# Patient Record
Sex: MALE | Race: WHITE | Employment: FULL TIME | URBAN - METROPOLITAN AREA
[De-identification: names, ages, dates, MRNs, and addresses within clinical notes are randomized per-mention and may not be internally consistent; named-entity substitution may affect disease eponyms.]

---

## 2017-01-20 PROCEDURE — 82043 UR ALBUMIN QUANTITATIVE: CPT | Performed by: INTERNAL MEDICINE

## 2017-01-20 PROCEDURE — 84153 ASSAY OF PSA TOTAL: CPT | Performed by: INTERNAL MEDICINE

## 2017-01-20 PROCEDURE — 82570 ASSAY OF URINE CREATININE: CPT | Performed by: INTERNAL MEDICINE

## 2018-02-19 PROBLEM — G47.33 OSA (OBSTRUCTIVE SLEEP APNEA): Status: ACTIVE | Noted: 2018-02-19

## 2018-02-19 PROCEDURE — 84153 ASSAY OF PSA TOTAL: CPT | Performed by: INTERNAL MEDICINE

## 2018-02-19 PROCEDURE — 82570 ASSAY OF URINE CREATININE: CPT | Performed by: INTERNAL MEDICINE

## 2018-02-19 PROCEDURE — 82043 UR ALBUMIN QUANTITATIVE: CPT | Performed by: INTERNAL MEDICINE

## 2018-06-02 ENCOUNTER — APPOINTMENT (OUTPATIENT)
Dept: CT IMAGING | Facility: HOSPITAL | Age: 43
End: 2018-06-02
Attending: EMERGENCY MEDICINE
Payer: COMMERCIAL

## 2018-06-02 ENCOUNTER — HOSPITAL ENCOUNTER (EMERGENCY)
Facility: HOSPITAL | Age: 43
Discharge: HOME | End: 2018-06-03
Attending: EMERGENCY MEDICINE
Payer: COMMERCIAL

## 2018-06-02 DIAGNOSIS — R10.12 ABDOMINAL PAIN, LEFT UPPER QUADRANT: Primary | ICD-10-CM

## 2018-06-02 PROCEDURE — 96361 HYDRATE IV INFUSION ADD-ON: CPT

## 2018-06-02 PROCEDURE — 74176 CT ABD & PELVIS W/O CONTRAST: CPT | Performed by: EMERGENCY MEDICINE

## 2018-06-02 PROCEDURE — 80076 HEPATIC FUNCTION PANEL: CPT | Performed by: EMERGENCY MEDICINE

## 2018-06-02 PROCEDURE — 85007 BL SMEAR W/DIFF WBC COUNT: CPT | Performed by: EMERGENCY MEDICINE

## 2018-06-02 PROCEDURE — 96374 THER/PROPH/DIAG INJ IV PUSH: CPT

## 2018-06-02 PROCEDURE — 85025 COMPLETE CBC W/AUTO DIFF WBC: CPT | Performed by: EMERGENCY MEDICINE

## 2018-06-02 PROCEDURE — 83690 ASSAY OF LIPASE: CPT | Performed by: EMERGENCY MEDICINE

## 2018-06-02 PROCEDURE — 81003 URINALYSIS AUTO W/O SCOPE: CPT | Performed by: EMERGENCY MEDICINE

## 2018-06-02 PROCEDURE — 85027 COMPLETE CBC AUTOMATED: CPT | Performed by: EMERGENCY MEDICINE

## 2018-06-02 PROCEDURE — 99284 EMERGENCY DEPT VISIT MOD MDM: CPT

## 2018-06-02 PROCEDURE — 80048 BASIC METABOLIC PNL TOTAL CA: CPT | Performed by: EMERGENCY MEDICINE

## 2018-06-02 RX ORDER — KETOROLAC TROMETHAMINE 15 MG/ML
15 INJECTION, SOLUTION INTRAMUSCULAR; INTRAVENOUS ONCE
Status: COMPLETED | OUTPATIENT
Start: 2018-06-02 | End: 2018-06-02

## 2018-06-03 VITALS
RESPIRATION RATE: 18 BRPM | BODY MASS INDEX: 39.37 KG/M2 | OXYGEN SATURATION: 97 % | HEIGHT: 66 IN | SYSTOLIC BLOOD PRESSURE: 133 MMHG | HEART RATE: 74 BPM | DIASTOLIC BLOOD PRESSURE: 70 MMHG | TEMPERATURE: 98 F | WEIGHT: 245 LBS

## 2018-06-03 RX ORDER — PANTOPRAZOLE SODIUM 40 MG/1
40 TABLET, DELAYED RELEASE ORAL DAILY
Qty: 14 TABLET | Refills: 0 | Status: SHIPPED | OUTPATIENT
Start: 2018-06-03 | End: 2018-06-17

## 2018-06-03 NOTE — ED PROVIDER NOTES
Patient Seen in: [facility] AND [facility] Emergency Department    History   Patient presents with:  Abdomen/Flank Pain (GI/)    Stated Complaint: Abdo/Back Pain    HPI    51-year-old male has had intermittent left flank pain for about 1 week.   He now complain 98 °F (36.7 °C)   Resp 16   Ht 167.6 cm (5' 6\")   Wt 111.1 kg   SpO2 97%   BMI 39.54 kg/m²         Physical Exam   Constitutional: He is oriented to person, place, and time. He appears well-developed and well-nourished. No distress.    HENT:   Head: Normoc following orders were created for panel order CBC WITH DIFFERENTIAL WITH PLATELET.   Procedure                               Abnormality         Status                     ---------                               -----------         ------

## 2021-04-11 DIAGNOSIS — Z23 NEED FOR VACCINATION: ICD-10-CM

## 2021-05-27 ENCOUNTER — APPOINTMENT (OUTPATIENT)
Dept: GENERAL RADIOLOGY | Facility: HOSPITAL | Age: 46
End: 2021-05-27
Attending: EMERGENCY MEDICINE
Payer: COMMERCIAL

## 2021-05-27 ENCOUNTER — HOSPITAL ENCOUNTER (EMERGENCY)
Facility: HOSPITAL | Age: 46
Discharge: HOME | End: 2021-05-28
Attending: EMERGENCY MEDICINE
Payer: COMMERCIAL

## 2021-05-27 DIAGNOSIS — J40 BRONCHITIS: Primary | ICD-10-CM

## 2021-05-27 PROCEDURE — 99284 EMERGENCY DEPT VISIT MOD MDM: CPT

## 2021-05-27 PROCEDURE — 93010 ELECTROCARDIOGRAM REPORT: CPT | Performed by: EMERGENCY MEDICINE

## 2021-05-27 PROCEDURE — 71045 X-RAY EXAM CHEST 1 VIEW: CPT | Performed by: EMERGENCY MEDICINE

## 2021-05-27 PROCEDURE — 94640 AIRWAY INHALATION TREATMENT: CPT

## 2021-05-27 PROCEDURE — 93005 ELECTROCARDIOGRAM TRACING: CPT

## 2021-05-27 RX ORDER — IPRATROPIUM BROMIDE AND ALBUTEROL SULFATE 2.5; .5 MG/3ML; MG/3ML
3 SOLUTION RESPIRATORY (INHALATION) ONCE
Status: COMPLETED | OUTPATIENT
Start: 2021-05-27 | End: 2021-05-27

## 2021-05-28 VITALS
TEMPERATURE: 98 F | DIASTOLIC BLOOD PRESSURE: 81 MMHG | SYSTOLIC BLOOD PRESSURE: 103 MMHG | RESPIRATION RATE: 18 BRPM | OXYGEN SATURATION: 95 % | HEART RATE: 103 BPM | WEIGHT: 259 LBS | BODY MASS INDEX: 41 KG/M2

## 2021-05-28 RX ORDER — PREDNISONE 20 MG/1
40 TABLET ORAL ONCE
Status: COMPLETED | OUTPATIENT
Start: 2021-05-28 | End: 2021-05-28

## 2021-05-28 RX ORDER — PREDNISONE 20 MG/1
40 TABLET ORAL DAILY
Qty: 10 TABLET | Refills: 0 | Status: ON HOLD | OUTPATIENT
Start: 2021-05-28 | End: 2021-06-04

## 2021-05-28 RX ORDER — ALBUTEROL SULFATE 90 UG/1
2 AEROSOL, METERED RESPIRATORY (INHALATION) EVERY 4 HOURS PRN
Qty: 18 G | Refills: 0 | Status: SHIPPED | OUTPATIENT
Start: 2021-05-28 | End: 2021-06-27

## 2021-05-28 NOTE — ED PROVIDER NOTES
Patient Seen in: [facility] AND [facility] Emergency Department    History   Patient presents with:  Cough/URI  Difficulty Breathing      HPI    The patient presents to the ED complaining of a dry cough and shortness of breath started today.   He states that he i No      Sexual activity: Never    Other Topics      Concerns:        Caffeine Concern: No      ROS  Pertinent positives: Cough, shortness of breath, wheezing  All other organ systems are reviewed and are negative.     Constitutional and vital signs reviewed Psychiatric:         Mood and Affect: Mood normal.         Behavior: Behavior normal.         ED Course      Labs Reviewed - No data to display  EKG    Rate, intervals and axes as noted on EKG Report.   Rate: Tachycardia  Rhythm: Sinus Rhythm  Reading: No department: Stable    Disposition and Plan     Clinical Impression:  Bronchitis  (primary encounter diagnosis)    Disposition:  Discharge    Follow-up:  Osvaldo Barnard MD  [address]    Schedule an a

## 2021-05-28 NOTE — ED QUICK NOTES
Discharge instructions given to patient. Patient states understanding. Patient alert and oriented leaving with spouse. Instructed patient to follow up with MD listed on discharge papers, or return to ED if symptoms worsen.

## 2021-05-29 ENCOUNTER — APPOINTMENT (OUTPATIENT)
Dept: GENERAL RADIOLOGY | Facility: HOSPITAL | Age: 46
DRG: 286 | End: 2021-05-29
Attending: EMERGENCY MEDICINE
Payer: COMMERCIAL

## 2021-05-29 ENCOUNTER — HOSPITAL ENCOUNTER (INPATIENT)
Facility: HOSPITAL | Age: 46
LOS: 8 days | Discharge: HOME | DRG: 286 | End: 2021-06-07
Attending: EMERGENCY MEDICINE | Admitting: HOSPITALIST
Payer: COMMERCIAL

## 2021-05-29 DIAGNOSIS — I50.9 HEART FAILURE, UNSPECIFIED HF CHRONICITY, UNSPECIFIED HEART FAILURE TYPE (HCC): Primary | ICD-10-CM

## 2021-05-29 DIAGNOSIS — N17.9 AKI (ACUTE KIDNEY INJURY) (HCC): ICD-10-CM

## 2021-05-29 DIAGNOSIS — R06.00 DYSPNEA, UNSPECIFIED TYPE: ICD-10-CM

## 2021-05-29 PROCEDURE — 71045 X-RAY EXAM CHEST 1 VIEW: CPT | Performed by: EMERGENCY MEDICINE

## 2021-05-29 PROCEDURE — 85025 COMPLETE CBC W/AUTO DIFF WBC: CPT | Performed by: EMERGENCY MEDICINE

## 2021-05-29 PROCEDURE — 83880 ASSAY OF NATRIURETIC PEPTIDE: CPT | Performed by: EMERGENCY MEDICINE

## 2021-05-29 PROCEDURE — 85060 BLOOD SMEAR INTERPRETATION: CPT | Performed by: EMERGENCY MEDICINE

## 2021-05-29 PROCEDURE — 96374 THER/PROPH/DIAG INJ IV PUSH: CPT

## 2021-05-29 PROCEDURE — 93010 ELECTROCARDIOGRAM REPORT: CPT | Performed by: EMERGENCY MEDICINE

## 2021-05-29 PROCEDURE — 84484 ASSAY OF TROPONIN QUANT: CPT | Performed by: EMERGENCY MEDICINE

## 2021-05-29 PROCEDURE — 80048 BASIC METABOLIC PNL TOTAL CA: CPT | Performed by: EMERGENCY MEDICINE

## 2021-05-29 PROCEDURE — 93005 ELECTROCARDIOGRAM TRACING: CPT

## 2021-05-29 PROCEDURE — 99285 EMERGENCY DEPT VISIT HI MDM: CPT

## 2021-05-30 ENCOUNTER — APPOINTMENT (OUTPATIENT)
Dept: CT IMAGING | Facility: HOSPITAL | Age: 46
DRG: 286 | End: 2021-05-30
Attending: HOSPITALIST
Payer: COMMERCIAL

## 2021-05-30 ENCOUNTER — APPOINTMENT (OUTPATIENT)
Dept: CT IMAGING | Facility: HOSPITAL | Age: 46
DRG: 286 | End: 2021-05-30
Attending: EMERGENCY MEDICINE
Payer: COMMERCIAL

## 2021-05-30 PROBLEM — R06.00 DYSPNEA: Status: ACTIVE | Noted: 2021-05-30

## 2021-05-30 PROBLEM — I50.9 HEART FAILURE, UNSPECIFIED HF CHRONICITY, UNSPECIFIED HEART FAILURE TYPE (HCC): Status: ACTIVE | Noted: 2021-05-30

## 2021-05-30 PROBLEM — R06.00 DYSPNEA, UNSPECIFIED TYPE: Status: ACTIVE | Noted: 2021-05-30

## 2021-05-30 PROCEDURE — 93010 ELECTROCARDIOGRAM REPORT: CPT | Performed by: HOSPITALIST

## 2021-05-30 PROCEDURE — 80048 BASIC METABOLIC PNL TOTAL CA: CPT | Performed by: HOSPITALIST

## 2021-05-30 PROCEDURE — 84132 ASSAY OF SERUM POTASSIUM: CPT | Performed by: HOSPITALIST

## 2021-05-30 PROCEDURE — 71260 CT THORAX DX C+: CPT | Performed by: EMERGENCY MEDICINE

## 2021-05-30 PROCEDURE — 84484 ASSAY OF TROPONIN QUANT: CPT | Performed by: HOSPITALIST

## 2021-05-30 PROCEDURE — 93005 ELECTROCARDIOGRAM TRACING: CPT

## 2021-05-30 PROCEDURE — 80061 LIPID PANEL: CPT | Performed by: HOSPITALIST

## 2021-05-30 PROCEDURE — 84443 ASSAY THYROID STIM HORMONE: CPT | Performed by: HOSPITALIST

## 2021-05-30 PROCEDURE — 74176 CT ABD & PELVIS W/O CONTRAST: CPT | Performed by: HOSPITALIST

## 2021-05-30 PROCEDURE — 83690 ASSAY OF LIPASE: CPT | Performed by: HOSPITALIST

## 2021-05-30 PROCEDURE — 84145 PROCALCITONIN (PCT): CPT | Performed by: HOSPITALIST

## 2021-05-30 RX ORDER — ACETAMINOPHEN 325 MG/1
650 TABLET ORAL EVERY 6 HOURS PRN
Status: DISCONTINUED | OUTPATIENT
Start: 2021-05-30 | End: 2021-06-07

## 2021-05-30 RX ORDER — ATORVASTATIN CALCIUM 40 MG/1
40 TABLET, FILM COATED ORAL DAILY
Status: DISCONTINUED | OUTPATIENT
Start: 2021-05-30 | End: 2021-06-07

## 2021-05-30 RX ORDER — FLUTICASONE PROPIONATE 50 MCG
2 SPRAY, SUSPENSION (ML) NASAL DAILY
Status: DISCONTINUED | OUTPATIENT
Start: 2021-05-30 | End: 2021-06-07

## 2021-05-30 RX ORDER — POTASSIUM CHLORIDE 20 MEQ/1
40 TABLET, EXTENDED RELEASE ORAL EVERY 4 HOURS
Status: COMPLETED | OUTPATIENT
Start: 2021-05-30 | End: 2021-05-30

## 2021-05-30 RX ORDER — FUROSEMIDE 10 MG/ML
40 INJECTION INTRAMUSCULAR; INTRAVENOUS ONCE
Status: COMPLETED | OUTPATIENT
Start: 2021-05-30 | End: 2021-05-30

## 2021-05-30 RX ORDER — ALBUTEROL SULFATE 90 UG/1
2 AEROSOL, METERED RESPIRATORY (INHALATION) EVERY 4 HOURS PRN
Status: DISCONTINUED | OUTPATIENT
Start: 2021-05-30 | End: 2021-06-07

## 2021-05-30 RX ORDER — FUROSEMIDE 10 MG/ML
20 INJECTION INTRAMUSCULAR; INTRAVENOUS
Status: DISCONTINUED | OUTPATIENT
Start: 2021-05-30 | End: 2021-06-01

## 2021-05-30 RX ORDER — ONDANSETRON 2 MG/ML
4 INJECTION INTRAMUSCULAR; INTRAVENOUS EVERY 6 HOURS PRN
Status: DISCONTINUED | OUTPATIENT
Start: 2021-05-30 | End: 2021-06-07

## 2021-05-30 RX ORDER — PROCHLORPERAZINE EDISYLATE 5 MG/ML
5 INJECTION INTRAMUSCULAR; INTRAVENOUS EVERY 8 HOURS PRN
Status: DISCONTINUED | OUTPATIENT
Start: 2021-05-30 | End: 2021-06-07

## 2021-05-30 RX ORDER — CETIRIZINE HYDROCHLORIDE 10 MG/1
10 TABLET ORAL DAILY
Status: DISCONTINUED | OUTPATIENT
Start: 2021-05-30 | End: 2021-06-07

## 2021-05-30 RX ORDER — MAGNESIUM HYDROXIDE/ALUMINUM HYDROXICE/SIMETHICONE 120; 1200; 1200 MG/30ML; MG/30ML; MG/30ML
30 SUSPENSION ORAL 4 TIMES DAILY PRN
Status: DISCONTINUED | OUTPATIENT
Start: 2021-05-30 | End: 2021-06-07

## 2021-05-30 RX ORDER — PANTOPRAZOLE SODIUM 40 MG/1
40 TABLET, DELAYED RELEASE ORAL
Status: DISCONTINUED | OUTPATIENT
Start: 2021-05-30 | End: 2021-06-05

## 2021-05-30 NOTE — ED QUICK NOTES
Assumed care of James upon arrival in room 48 via triage. Patient A&Ox4, on continuous cardiac monitoring at this time; tachy 110s. See triage note and nursing assessment.  Patient further reports dyspnea on exertion, orthopnea, and productive cough that exa

## 2021-05-30 NOTE — ED QUICK NOTES
Orders for admission, patient is aware of plan and ready to go upstairs. Any questions, please call ED RN Teresa Baxter  at extension [phone].    Type of COVID test sent:Rapid - not detected  COVID Suspicion level: Low/High    Titratable drug(s) infusing:NA  Rate:

## 2021-05-30 NOTE — ED INITIAL ASSESSMENT (HPI)
Patient complains of shortness of breath on and off since Thursday. Seen here and dx'd with bronchitis. Sent home with nebs, now complains of worsening shortness of breath associated with chest tightness of onset 9pm tonight. SOB unrelieved by home nebs.

## 2021-05-30 NOTE — ED QUICK NOTES
Verbal order given from MD Martino to wave labs prior to CT scan. Patient enroute for scanning at this time.

## 2021-05-30 NOTE — ED PROVIDER NOTES
Patient Seen in: [facility] AND [facility] Emergency Department      History   Patient presents with:  Chest Pain Angina  Difficulty Breathing    Stated Complaint: SOB     HPI/Subjective:   HPI    55-year-old male with history of hypertension, high cholesterol, HPI.  Constitutional and vital signs reviewed. All other systems reviewed and negative except as noted above.     Physical Exam     ED Triage Vitals [05/29/21 2219]   /80   Pulse (!) 125   Resp (!) 30   Temp 98 °F (36.7 °C)   Temp src    SpO2 96 (*)     RDW 16.6 (*)     Neutrophil Absolute Prelim 13.63 (*)     All other components within normal limits   TROPONIN I - Normal   RAPID SARS-COV-2 BY PCR - Normal   CBC WITH DIFFERENTIAL WITH PLATELET    Narrative:      The following orders were created f minimal bilateral pleural effusions. 4. No focal airspace disease suspicious for pneumonia. XR CHEST at 2357 hrs    COMPARISON: 5/27/2091    IMPRESSION:    Mild pulmonary edema. No confluent consolidation. No pleural effusion or pneumothorax.   Harrison Noted to desaturate to 92% on room air while sitting in bed, placed on 2 L nasal cannula and given Lasix. Discussed with hospitalist.  Admitted for further evaluation and monitoring. Further Inpatient evaluation and treatment will be required.  I per

## 2021-05-30 NOTE — H&P
[facility] Hospitalist H&P     CC: Patient presents with:  Chest Pain Angina  Difficulty Breathing     PCP: Harinder Gonsalez MD    Date of Admission: 5/29/2021 11:34 PM    ASSESSMENT / PLAN:     Mr. Dannie Juarez is a 27-year-old male with past medical history of h shortness of breath with laying flat and came back to the ER last night. CT chest was negative for PE, but was concerning for fluid overload. Patient also complaining of L sided abdominal pain, +nausea and vomiting.  Thought it was similar to his gastritis Rfl:           Soc Hx  Social History    Tobacco Use      Smoking status: Never Smoker      Smokeless tobacco: Never Used    Alcohol use: No       Fam Hx  Family History   Problem Relation Age of Onset   • Diabetes Mother    • Lipids Mother    • Hypertensi congestion. [facility] provided a preliminary report for this examination. This final report agrees with their preliminary findings.    Dictated by (CST): Kathy Dunne MD on 5/30/2021 at 7:24 AM     Finalized by (CST): Kathy Dunne MD on 5/30/2021 a

## 2021-05-30 NOTE — CONSULTS
[facility] Cardiology Consultation    Andres Roberts Patient Status:  Inpatient    1975 MRN [id number]   Location [facility] 3W/SW Attending Phill Payan MD   Hosp Day # 0 PCP Jeanette Foley MD     Reason for Consultation:  SOB      Histo Other          Allergies:  No Known Allergies    Medications:  • furosemide  20 mg Intravenous BID (Diuretic)   • atorvastatin  40 mg Oral Daily   • Cetirizine HCl  10 mg Oral Daily   • Fluticasone Propionate  2 spray Nasal Daily   • Pantoprazole Sodium  4 HL  8. Nausea and vomiting  9. Morbid obesity    Recommend:  1. Cont iv diuresis with lasix BID  2. Echo - given sinus tachy concern for low cardiac output/cardiomyopathy  3. Ischemic evaluation pending results of echo  4.  Start BB              Michelle Perdomo

## 2021-05-30 NOTE — PLAN OF CARE
+ SOB at rest, with exertion, and orthopnea. Pt currently sleeping in chair. Maintained 2L O2 nasal cannula with saturations mid 90s. Con't IV lasix. Wife at bedside for support.  Plan for 2D echo in AM.     Problem: RESPIRATORY - ADULT  Goal: Achieves opti interventions  Outcome: Progressing  Goal: Patient/Family Short Term Goal  Description: Patient's Short Term Goal: \"to be able to breathe better.  I get so short of breath\"    Interventions:   - activity as tolerated  - IV lasix  - wean off oxygen as tole

## 2021-05-30 NOTE — PLAN OF CARE
Pt felt nauseous today and vomited x1. PRN medication used to relieve nausea. C/o discomfort in chest/abdomen reported to MD. 2D echo and CT of abd/pelvis w/ contrast to be completed today.     Problem: Patient Centered Care  Goal: Patient preferences are i Assess quality of pulses, skin color and temperature  - Assess for signs of decreased coronary artery perfusion - ex.  Angina  - Evaluate fluid balance, assess for edema, trend weights  Outcome: Progressing  Goal: Absence of cardiac arrhythmias or at baseli

## 2021-05-30 NOTE — RESPIRATORY THERAPY NOTE
CPAP eval completed. Family states patient had a sleep study done years ago and was never followed up and patient does not use any CPAP machine at home.

## 2021-05-31 ENCOUNTER — APPOINTMENT (OUTPATIENT)
Dept: CV DIAGNOSTICS | Facility: HOSPITAL | Age: 46
DRG: 286 | End: 2021-05-31
Attending: HOSPITALIST
Payer: COMMERCIAL

## 2021-05-31 PROCEDURE — 85027 COMPLETE CBC AUTOMATED: CPT | Performed by: HOSPITALIST

## 2021-05-31 PROCEDURE — 80053 COMPREHEN METABOLIC PANEL: CPT | Performed by: HOSPITALIST

## 2021-05-31 PROCEDURE — 83735 ASSAY OF MAGNESIUM: CPT | Performed by: HOSPITALIST

## 2021-05-31 PROCEDURE — 93306 TTE W/DOPPLER COMPLETE: CPT | Performed by: HOSPITALIST

## 2021-05-31 RX ORDER — METOPROLOL SUCCINATE 25 MG/1
25 TABLET, EXTENDED RELEASE ORAL
Status: DISCONTINUED | OUTPATIENT
Start: 2021-05-31 | End: 2021-06-02

## 2021-05-31 RX ORDER — CHLORHEXIDINE GLUCONATE 4 G/100ML
30 SOLUTION TOPICAL
Status: COMPLETED | OUTPATIENT
Start: 2021-06-01 | End: 2021-06-01

## 2021-05-31 RX ORDER — SODIUM CHLORIDE 9 MG/ML
INJECTION, SOLUTION INTRAVENOUS
Status: DISCONTINUED | OUTPATIENT
Start: 2021-06-01 | End: 2021-06-01

## 2021-05-31 NOTE — PLAN OF CARE
Problem: Patient Centered Care  Goal: Patient preferences are identified and integrated in the patient's plan of care  Description: Interventions:  - What would you like us to know as we care for you?  Benjie Enamorado lives at home with his wife and children  - Prov Progressing  Goal: Absence of cardiac arrhythmias or at baseline  Description: INTERVENTIONS:  - Continuous cardiac monitoring, monitor vital signs, obtain 12 lead EKG if indicated  - Evaluate effectiveness of antiarrhythmic and heart rate control medicati

## 2021-05-31 NOTE — PLAN OF CARE
No nausea/vomiting overnight. Pt reports SOB has improved. Con't IV lasix. Pt ambulating ad lashonda. Maintained 2L O2 nasal cannula with saturations mid 90s. Plan for 2D echo.      Problem: Patient Centered Care  Goal: Patient preferences are identified and int pulses, skin color and temperature  - Assess for signs of decreased coronary artery perfusion - ex.  Angina  - Evaluate fluid balance, assess for edema, trend weights  Outcome: Progressing  Goal: Absence of cardiac arrhythmias or at baseline  Description: I

## 2021-05-31 NOTE — PROGRESS NOTES
[facility] Hospitalist Progress Note     CC: Hospital Follow up    PCP: Sally Gardner MD       Assessment/Plan:     Principal Problem:    Heart failure, unspecified HF chronicity, unspecified heart failure type [facility])  Active Problems:    Dyspnea    Dyspnea, Subjective:     Feels a little better. Had a lot of N/V.     OBJECTIVE:    Blood pressure 102/62, pulse 110, temperature 98.7 °F (37.1 °C), temperature source Oral, resp. rate 18, height 5' 7\" (1.702 m), weight 256 lb 1.6 oz (116.2 kg), SpO2 94 %.     Temp Punctate nonobstructing right upper pole caliceal calculus. Multiple other incidental findings as described in the body of the report.       Dictated by (CST): Jessica Aguilar MD on 5/30/2021 at 6:28 PM     Finalized by (CST): Jessica Aguilar MD on 5/30/2021 at

## 2021-05-31 NOTE — PROGRESS NOTES
[facility] [address] [place] [address] Cardiology Progress Note        Verner Harness Patient Status:  Inpatient    1975 MRN [id number]   Location [facility] 3W/SW Attending Lillian Alvarez MD   Hosp Day # 1 PCP MD Delvin Monroy 33.0*   BUN 20* 21*  --  20*   CREATSERUM 0.92 0.97  --  1.25   CA 8.6 8.6  --  8.3*   MG  --   --   --  2.5   * 109*  --  108*       Recent Labs   Lab 05/31/21  0749   ALT 48   AST 19   ALB 3.1*       Recent Labs   Lab 05/29/21  9865 05/30/21  1012

## 2021-06-01 ENCOUNTER — APPOINTMENT (OUTPATIENT)
Dept: INTERVENTIONAL RADIOLOGY/VASCULAR | Facility: HOSPITAL | Age: 46
DRG: 286 | End: 2021-06-01
Attending: INTERNAL MEDICINE
Payer: COMMERCIAL

## 2021-06-01 PROCEDURE — B2151ZZ FLUOROSCOPY OF LEFT HEART USING LOW OSMOLAR CONTRAST: ICD-10-PCS | Performed by: INTERNAL MEDICINE

## 2021-06-01 PROCEDURE — B2111ZZ FLUOROSCOPY OF MULTIPLE CORONARY ARTERIES USING LOW OSMOLAR CONTRAST: ICD-10-PCS | Performed by: INTERNAL MEDICINE

## 2021-06-01 PROCEDURE — 93460 R&L HRT ART/VENTRICLE ANGIO: CPT

## 2021-06-01 PROCEDURE — 4A023N8 MEASUREMENT OF CARDIAC SAMPLING AND PRESSURE, BILATERAL, PERCUTANEOUS APPROACH: ICD-10-PCS | Performed by: INTERNAL MEDICINE

## 2021-06-01 PROCEDURE — 80048 BASIC METABOLIC PNL TOTAL CA: CPT | Performed by: HOSPITALIST

## 2021-06-01 PROCEDURE — 85027 COMPLETE CBC AUTOMATED: CPT | Performed by: HOSPITALIST

## 2021-06-01 PROCEDURE — 99152 MOD SED SAME PHYS/QHP 5/>YRS: CPT

## 2021-06-01 RX ORDER — FUROSEMIDE 10 MG/ML
40 INJECTION INTRAMUSCULAR; INTRAVENOUS 3 TIMES DAILY
Status: DISCONTINUED | OUTPATIENT
Start: 2021-06-01 | End: 2021-06-04

## 2021-06-01 RX ORDER — LIDOCAINE HYDROCHLORIDE 20 MG/ML
INJECTION, SOLUTION EPIDURAL; INFILTRATION; INTRACAUDAL; PERINEURAL
Status: DISCONTINUED
Start: 2021-06-01 | End: 2021-06-01 | Stop reason: WASHOUT

## 2021-06-01 RX ORDER — LIDOCAINE HYDROCHLORIDE 20 MG/ML
INJECTION, SOLUTION EPIDURAL; INFILTRATION; INTRACAUDAL; PERINEURAL
Status: COMPLETED
Start: 2021-06-01 | End: 2021-06-01

## 2021-06-01 RX ORDER — ASPIRIN 81 MG/1
TABLET, CHEWABLE ORAL
Status: COMPLETED
Start: 2021-06-01 | End: 2021-06-01

## 2021-06-01 RX ORDER — MIDAZOLAM HYDROCHLORIDE 1 MG/ML
INJECTION INTRAMUSCULAR; INTRAVENOUS
Status: COMPLETED
Start: 2021-06-01 | End: 2021-06-01

## 2021-06-01 RX ORDER — ASPIRIN 81 MG/1
81 TABLET, CHEWABLE ORAL DAILY
Status: DISCONTINUED | OUTPATIENT
Start: 2021-06-01 | End: 2021-06-07

## 2021-06-01 RX ORDER — ENALAPRIL MALEATE 5 MG/1
5 TABLET ORAL 2 TIMES DAILY
Status: DISCONTINUED | OUTPATIENT
Start: 2021-06-01 | End: 2021-06-07

## 2021-06-01 NOTE — PROGRESS NOTES
[facility] Hospitalist Progress Note     CC: Hospital Follow up    PCP: Selma Mortensen MD       Assessment/Plan:     Principal Problem:    Heart failure, unspecified HF chronicity, unspecified heart failure type [facility])  Active Problems:    Dyspnea    Dyspnea, patient while in house     Patient and/or patient's family given opportunity to ask questions and note understanding and agreeing with therapeutic plan as outlined     Reva Lorenzo MD  [facility] Hospitalist  Answering Service number: [phone]     [facility] 140  --   --  139 141   K 3.4*   < > 4.5 3.9 3.9     --   --  101 102   CO2 33.0*  --   --  33.0* 34.0*    < > = values in this interval not displayed.        Recent Labs   Lab 05/31/21  0749   ALT 48   AST 19   ALB 3.1*         Imaging:  CT ABDOMEN+P Daily   • Fluticasone Propionate  2 spray Nasal Daily   • Pantoprazole Sodium  40 mg Oral QAM AC       acetaminophen, ondansetron HCl, Prochlorperazine Edisylate, Albuterol Sulfate HFA, Alum & Mag Hydroxide-Simeth

## 2021-06-01 NOTE — PLAN OF CARE
Pt A&Ox4. Self care. From home with wife. Had left and right cardiac cath through right groin this morning. On bedrest until 10:10. Medical management. Pt and wife updated on plan of care.    Problem: Patient Centered Care  Goal: Patient preferences are viola quality of pulses, skin color and temperature  - Assess for signs of decreased coronary artery perfusion - ex.  Angina  - Evaluate fluid balance, assess for edema, trend weights  Outcome: Progressing  Goal: Absence of cardiac arrhythmias or at baseline  Giuseppe

## 2021-06-01 NOTE — PLAN OF CARE
Patient alert x4,  appears to be very anxious. On 2L NC. Pt remains on IV Lasix. NPO at midnight for cath in AM. Educated patient to call for assistance, call light within reach.       Problem: Patient Centered Care  Goal: Patient preferences are identified quality of pulses, skin color and temperature  - Assess for signs of decreased coronary artery perfusion - ex.  Angina  - Evaluate fluid balance, assess for edema, trend weights  Outcome: Progressing  Goal: Absence of cardiac arrhythmias or at baseline  Giuseppe

## 2021-06-01 NOTE — PROCEDURES
[facility] Cardiac Cath Procedure Note    Elonda Knife Patient Status:  Inpatient    1975 MRN [id number]   Location [facility] 3W/SW Attending Debby Gardner, [address] [facility] Day # 2 PCP Harinder Gonsalez MD       Cardiologist: Anthony CALVO heart catheterization was performed with a 7 [place]ont[place]-Karen catheter which was advanced under the pulmonary artery under fluoroscopic guidance. Simultaneous right and left heart pressures were obtained.     Selective coronary angiography performed with J

## 2021-06-01 NOTE — CONSULTS
Pulmonary H&P/Consult     NAME: [id number]: [id number] - MRN: [id number] - Age: 39year old - :  1975    Date of Admission: 2021 11:34 PM  Admission Diagnosis: Dyspnea, unspecified type [R06.00]  Heart failure, unspecified HF chronic AHI 15 Supine AHI 25 Sao2 Mario 84%   • MEME (obstructive sleep apnea) 2/19/2018   • Other and unspecified hyperlipidemia    • Prediabetes 11/19/2015   • Reflux    • Unspecified essential hypertension      Past Surgical History:   Procedure Laterality Da < > 25.4*   MCHC 31.1   < > 31.1   RDW 16.6*   < > 16.9*   NEPRELIM 13.63*  --   --    WBC 17.7*   < > 12.2*   .0   < > 221.0    < > = values in this interval not displayed.      Recent Labs   Lab 05/30/21  0428 05/30/21  0428 05/30/21  1547 05/31/2

## 2021-06-01 NOTE — PAYOR COMM NOTE
--------------  ADMISSION REVIEW     Payor: [address] PPO  Subscriber #:  [id number]  Authorization Number: [id number]    Admit date: 5/30/21  Admit time:  2:03 AM       Admitting Physician: Mylene Chan MD  Attending Physician:  Sheng Rasheed 30   Wt 117.5 kg   SpO2 93%   BMI 40.57 kg/m²         Physical Exam  Vitals and nursing note reviewed. Constitutional:       General: He is not in acute distress. Appearance: He is well-developed. HENT:      Head: Normocephalic and atraumatic.    Ey Abnormality         Status                     ---------                               -----------         ------                     CBC W/ DIFFERENTIAL[[id number]]          Abnormal            Preliminary result           Please view results for these jesus alberto Medications   iopamidol (ISOVUE-M) 76 % injection 50 mL (50 mL Intravenous Given 5/30/21 0027)   furosemide (LASIX) injection 40 mg (40 mg Intravenous Given 5/30/21 0125)            05/30/21  0000 05/30/21  0030 05/30/21  0045 05/30/21  0100   BP: 118/75 1 management issues with the patient, and I explained the need for further follow-up evaluation and treatment.       Condition upon disposition: Stable    Admission disposition: 5/30/2021  1:22 AM             Disposition and Plan     Clinical Impression:  Maura Brumfield kg/m².  - weight loss endeavors    FN:  - IVF: none  - Diet: cardiac    DVT Prophy: SCD  Lines: PIV    Dispo: pending clinical course    Outpatient records or previous hospital records reviewed.      Further recommendations pending patient's clinical course Suspension, 2 sprays by Nasal route daily. , Disp: 3 Bottle, Rfl: 6  Fexofenadine HCl 180 MG Oral Tab, Take 180 mg by mouth daily. , Disp: , Rfl:           Soc Hx  Social History    Tobacco Use      Smoking status: Never Smoker      Smokeless tobacco: Never CONCLUSION: There are findings suggesting CHF and/or increased fluid volume status of the patient. There is cardiomegaly and pulmonary vascular congestion. [facility] provided a preliminary report for this examination.  This final report agrees wit and tightness in his chest. Seen in ED a few days ago and given albuterol inhaler and steroids for possible bronchitis. SOB, wheezing and tightness continued and worsened so came back to ER. Also had orthopnea/PND. Pro-BNP elevated to 1244.  CTA chest negat lipase level normal  - CT A/P without significant abnormality   - does have hx of kidney stones but CT without L sided stones, does have small non obstructive stone on R upper pole  - consider GI consult if worsens     Tachycardia  - HR 100s-130s  - sinus or wheezes  CV: tachycardic, regular rhythm, no murmurs  ABD: Soft, non-tender, non-distended, +BS  Neuro: Grossly normal, CN intact, sensory intact  Psych: Affect- normal  SKIN: warm, dry  EXT: no edema     Data Review:       Labs:           Recent Labs Date: 5/30/2021  CONCLUSION:  1. No acute pulmonary embolus to the subsegmental pulmonary artery level. 2.  Cardiomegaly. Prominence of the interstitium. Trace pleural effusion.   Findings could represent increased fluid volume status of the patient and/ picked up CPAP   - will have pulm eval in hospital to see if they can expedite sleep study as untreated MEME could be contributing to his NICM     Abdominal pain, N/V- currently resolved  - complains of L sided abdominal pain, +N/V  - lipase level normal  - 05/31/21 0455 : 256 lb 1.6 oz (116.2 kg)  05/30/21 0200 : 265 lb (120.2 kg)  05/29/21 2329 : 259 lb (117.5 kg)  05/27/21 2249 : 259 lb (117.5 kg)  10/22/20 1555 : 254 lb (115.2 kg)     Exam  GEN: male in NAD  HEENT: EOMI  Pulm: CTAB, no crackles or wheezes congestion. [facility] provided a preliminary report for this examination. This final report agrees with their preliminary findings.    Dictated by (CST): Rosa Callaway MD on 5/30/2021 at 7:24 AM     Finalized by (CST): Rosa Callaawy MD on 5/30/2021 a mL     Date Action Dose Route User    6/1/2021 0000 Given 30 mL Topical Alex Ceron, RN      Enalapril Maleate (VASOTEC) tab 5 mg     Date Action Dose Route User    6/1/2021 3650 Given 5 mg Oral Mira Macdonald RN      furosemide (LASIX) injection 40 m

## 2021-06-02 RX ORDER — METOPROLOL SUCCINATE 25 MG/1
25 TABLET, EXTENDED RELEASE ORAL ONCE
Status: COMPLETED | OUTPATIENT
Start: 2021-06-02 | End: 2021-06-02

## 2021-06-02 RX ORDER — METOPROLOL SUCCINATE 50 MG/1
50 TABLET, EXTENDED RELEASE ORAL
Status: DISCONTINUED | OUTPATIENT
Start: 2021-06-03 | End: 2021-06-07

## 2021-06-02 RX ORDER — SPIRONOLACTONE 25 MG/1
12.5 TABLET ORAL DAILY
Status: DISCONTINUED | OUTPATIENT
Start: 2021-06-02 | End: 2021-06-03

## 2021-06-02 NOTE — PLAN OF CARE
Pt awake and alert. Walking the halls with wife. Had rt and left heart cath 6/1. Medical management. On room air. Self care. Home with wife. On IV lasix. Will need a cpap at discharge.    Problem: Patient Centered Care  Goal: Patient preferences are identif quality of pulses, skin color and temperature  - Assess for signs of decreased coronary artery perfusion - ex.  Angina  - Evaluate fluid balance, assess for edema, trend weights  Outcome: Progressing  Goal: Absence of cardiac arrhythmias or at baseline  Giuseppe

## 2021-06-02 NOTE — PROGRESS NOTES
Pulmonary Progress Note     Assessment / Plan:  1. Acute hypoxic respiratory failure - from new onset cardiomyopathy. CTA chest without PE  - improved with diuresis, on RA now  - volume management per cardiology  2.  MEME  - will expedite outpatient PSG as a

## 2021-06-02 NOTE — PROGRESS NOTES
[facility] Hospitalist Progress Note     CC: Hospital Follow up    PCP: Aura Amezquita MD       Assessment/Plan:     Principal Problem:    Heart failure, unspecified HF chronicity, unspecified heart failure type [facility])  Active Problems:    Dyspnea    Dyspnea, recommendations pending patient's clinical course.   [facility] hospitalist to continue to follow patient while in house     Patient and/or patient's family given opportunity to ask questions and note understanding and agreeing with therapeutic plan as outlined    31.1   RDW 16.6* 17.5* 16.9*   NEPRELIM 13.63*  --   --    WBC 17.7* 11.7* 12.2*   .0 214.0 221.0         Recent Labs   Lab 05/30/21  0428 05/30/21  0428 05/30/21  1547 05/31/21  0749 06/01/21  0502   *  --   --  108* 100*   BUN 21*  --   --

## 2021-06-02 NOTE — PAYOR COMM NOTE
--------------  CONTINUED STAY REVIEW----REQUESTING ADDITIONAL DAY 6/2      Payor: [facility]  Subscriber #:  [id number]  Authorization Number: [id number]    Admit date: 5/30/21  Admit time:  2:03 AM    Admitting Physician: MD REJI Lindsey resolved     Tachycardia  - HR 100s-130s  - sinus on EKG  - TSH WNL     HTN  - BP stable   - hold home hydrochlorothiazide while on lasix     HL  - statin     Hx Kidney stones  - was on hydrochlorothiazide, currently off while on lasix     Morbid obesity in NAD  HEENT: EOMI  Pulm: CTAB, no crackles or wheezes  CV: tachycardic, regular rhythm, no murmurs  ABD: Soft, non-tender, non-distended, +BS  Neuro: Grossly normal, CN intact, sensory intact  Psych: Affect- normal  SKIN: warm, dry  EXT: no edema     Refugio melatonin, acetaminophen, ondansetron HCl, Prochlorperazine Edisylate, Albuterol Sulfate HFA, Alum & Mag Hydroxide-Simeth                              MEDICATIONS ADMINISTERED IN LAST 1 DAY:  Alum & Mag Hydroxide-Simeth (MAALOX) oral suspension 30 mL     D Given 12.5 mg Oral Patricio Headings, RN          Procedures:      Plan:

## 2021-06-02 NOTE — CM/SW NOTE
Received MDO for [employer] and Coverage. Per chart review, pt has [employer] PPO. SW met w/ pt and his wife in his room to discuss. Pt and wife confirmed pt has [employer] PPO. Pt's wife inquired about [place] paperwork and process.  SW informed pt and pt

## 2021-06-02 NOTE — PLAN OF CARE
Patient alert x4, on RA. Wife at bedside throughout the night. Pt woke up feeling anxious with multiple questions regarding his current condition. All questions answered, wife concerned about him not being able to sleep.  Patient stable at this time, jhon output  - Evaluate effectiveness of vasoactive medications to optimize hemodynamic stability  - Monitor arterial and/or venous puncture sites for bleeding and/or hematoma  - Assess quality of pulses, skin color and temperature  - Assess for signs of decrea

## 2021-06-02 NOTE — CARDIAC REHAB
CARDIAC REHAB HEART FAILURE EDUCATION    Handouts provided and reviewed: Heart Surgery Binder. Activity: Chair for all meals:        Ambulation:        Tolerated Activity:          Disease Process: Disease process reviewed.     Reviewed the following: D

## 2021-06-02 NOTE — PROGRESS NOTES
ASSESSMENT/PLAN:     39year old man with:     1. SOB with chest tightness; corns ok  2. Acute systolic dilated CHF with pulm htn  4. Obesity  5. MEME - never started CPAP  6. HTN   7. HL  9.  Morbid obesity     Recommend:  1. Cont iv diuresis with lasix B prior to today's visit):  No current outpatient medications on file. Allergies:  No Known Allergies    ROS:     Cardiovascular and respiratory review of systems are negative, except as described above. The patient denies any fevers or chills.   No ne

## 2021-06-03 PROCEDURE — 84132 ASSAY OF SERUM POTASSIUM: CPT | Performed by: HOSPITALIST

## 2021-06-03 PROCEDURE — 80048 BASIC METABOLIC PNL TOTAL CA: CPT | Performed by: INTERNAL MEDICINE

## 2021-06-03 PROCEDURE — 85025 COMPLETE CBC W/AUTO DIFF WBC: CPT | Performed by: INTERNAL MEDICINE

## 2021-06-03 RX ORDER — POTASSIUM CHLORIDE 1.5 G/1.77G
40 POWDER, FOR SOLUTION ORAL EVERY 4 HOURS
Status: COMPLETED | OUTPATIENT
Start: 2021-06-03 | End: 2021-06-03

## 2021-06-03 RX ORDER — SPIRONOLACTONE 25 MG/1
25 TABLET ORAL DAILY
Status: DISCONTINUED | OUTPATIENT
Start: 2021-06-03 | End: 2021-06-05

## 2021-06-03 RX ORDER — POTASSIUM CHLORIDE 20 MEQ/1
40 TABLET, EXTENDED RELEASE ORAL EVERY 4 HOURS
Status: DISCONTINUED | OUTPATIENT
Start: 2021-06-03 | End: 2021-06-03

## 2021-06-03 NOTE — PROGRESS NOTES
[facility] Hospitalist Progress Note     CC: Hospital Follow up    PCP: Lorraine Hopper MD       Assessment/Plan:     Principal Problem:    Heart failure, unspecified HF chronicity, unspecified heart failure type [facility])  Active Problems:    Dyspnea    Dyspnea, SCD  Lines: PIV     Dispo: pending clinical course, possible home tomorrow     Outpatient records or previous hospital records reviewed.      Further recommendations pending patient's clinical course.   [facility] hospitalist to continue to follow patient while in 24.9*   < > 25.2* 25.4* 25.3*   MCHC 31.1   < > 31.0 31.1 31.9   RDW 16.6*   < > 17.5* 16.9* 17.1*   NEPRELIM 13.63*  --   --   --  11.36*   WBC 17.7*   < > 11.7* 12.2* 14.7*   .0   < > 214.0 221.0 294.0    < > = values in this interval not displaye

## 2021-06-03 NOTE — PLAN OF CARE
Pt.recieved awake,oriented. Wife stayed overnight. No complaints of pain. Slept fairly. Still on IV lasix TID. Had 5.15 seconds PSVT rate of 167-187 during the night,asymptomatic. Call light within reach. Instructed to call for assistance. Will continue to monit

## 2021-06-03 NOTE — PROGRESS NOTES
ASSESSMENT/PLAN:     39year old man with:     1. SOB with chest tightness; corns ok  2. Acute systolic dilated CHF with pulm htn  4. Obesity  5. MEME - never started CPAP  6. HTN   7. HL  9.  Morbid obesity     Recommend:  1. Cont iv diuresis with lasix t Alcohol use: No    Drug use: No       Medications (Active prior to today's visit):  No current outpatient medications on file.        Allergies:  No Known Allergies    ROS:     Cardiovascular and respiratory review of systems are negative, except as describ

## 2021-06-03 NOTE — PLAN OF CARE
Pt awake in chair with wife a bedside. On room air. Self care. Able to make needs knows. IV lasix continued.    Problem: Patient/Family Goals  Goal: Patient/Family Long Term Goal  Description: Patient's Long Term Goal: Take better care of my heart    Interv

## 2021-06-03 NOTE — DIETARY NOTE
NUTRITION EDUCATION NOTE    Received verbal consult for heart failure nutrition education. Oneal Claude Appropriate education and handout(s) provided. See education section of Epic for specifics.     Paz Grissom RDN, LDN  Clinical Nutrition  Ext [phone]

## 2021-06-03 NOTE — CONSULTS
Hematology/Oncology Consult Note        NAME: [address]: [id number] - MRN: [id number] - Age: 39year old - : 1975    Reason for Consult:  Polycythemia    Patient is a 39 y.o male currently admitted with CHF exacerbation, he also has a hi spray Nasal Daily   • Pantoprazole Sodium  40 mg Oral QAM AC       ALLERGIES: No Known Allergies    Review of Systems   12 point review of systems was conducted and otherwise negative than HPI      Physical Exam:  /79 (BP Location: Left arm)   Pulse in the care of this patient, please call with any questions.     Cleveland Vaughn M.D.  [facility] Hematology and Oncology

## 2021-06-04 PROCEDURE — 80048 BASIC METABOLIC PNL TOTAL CA: CPT | Performed by: HOSPITALIST

## 2021-06-04 PROCEDURE — 83880 ASSAY OF NATRIURETIC PEPTIDE: CPT | Performed by: HOSPITALIST

## 2021-06-04 PROCEDURE — 84145 PROCALCITONIN (PCT): CPT | Performed by: HOSPITALIST

## 2021-06-04 PROCEDURE — 93005 ELECTROCARDIOGRAM TRACING: CPT

## 2021-06-04 PROCEDURE — 85027 COMPLETE CBC AUTOMATED: CPT | Performed by: HOSPITALIST

## 2021-06-04 PROCEDURE — 84484 ASSAY OF TROPONIN QUANT: CPT | Performed by: HOSPITALIST

## 2021-06-04 PROCEDURE — 85025 COMPLETE CBC W/AUTO DIFF WBC: CPT | Performed by: HOSPITALIST

## 2021-06-04 PROCEDURE — 93010 ELECTROCARDIOGRAM REPORT: CPT | Performed by: HOSPITALIST

## 2021-06-04 PROCEDURE — 93010 ELECTROCARDIOGRAM REPORT: CPT | Performed by: INTERNAL MEDICINE

## 2021-06-04 RX ORDER — METOPROLOL SUCCINATE 50 MG/1
50 TABLET, EXTENDED RELEASE ORAL
Qty: 30 TABLET | Refills: 0 | Status: SHIPPED | OUTPATIENT
Start: 2021-06-05 | End: 2021-07-02

## 2021-06-04 RX ORDER — TORSEMIDE 20 MG/1
20 TABLET ORAL DAILY
Status: DISCONTINUED | OUTPATIENT
Start: 2021-06-04 | End: 2021-06-07

## 2021-06-04 RX ORDER — SPIRONOLACTONE 25 MG/1
25 TABLET ORAL DAILY
Qty: 30 TABLET | Refills: 0 | Status: SHIPPED | OUTPATIENT
Start: 2021-06-05 | End: 2021-06-07

## 2021-06-04 RX ORDER — DILTIAZEM HYDROCHLORIDE 5 MG/ML
INJECTION INTRAVENOUS
Status: COMPLETED
Start: 2021-06-04 | End: 2021-06-04

## 2021-06-04 RX ORDER — TORSEMIDE 20 MG/1
20 TABLET ORAL DAILY
Qty: 30 TABLET | Refills: 0 | Status: SHIPPED | OUTPATIENT
Start: 2021-06-05 | End: 2021-07-07

## 2021-06-04 RX ORDER — POTASSIUM CHLORIDE 1.5 G/1.77G
20 POWDER, FOR SOLUTION ORAL DAILY
Status: DISCONTINUED | OUTPATIENT
Start: 2021-06-04 | End: 2021-06-04

## 2021-06-04 RX ORDER — AMIODARONE HYDROCHLORIDE 200 MG/1
400 TABLET ORAL 3 TIMES DAILY
Status: DISCONTINUED | OUTPATIENT
Start: 2021-06-04 | End: 2021-06-07

## 2021-06-04 RX ORDER — METOPROLOL TARTRATE 5 MG/5ML
INJECTION INTRAVENOUS
Status: COMPLETED
Start: 2021-06-04 | End: 2021-06-04

## 2021-06-04 RX ORDER — METOPROLOL TARTRATE 5 MG/5ML
INJECTION INTRAVENOUS
Status: DISCONTINUED
Start: 2021-06-04 | End: 2021-06-04 | Stop reason: WASHOUT

## 2021-06-04 RX ORDER — ENALAPRIL MALEATE 5 MG/1
5 TABLET ORAL 2 TIMES DAILY
Qty: 60 TABLET | Refills: 0 | Status: SHIPPED | OUTPATIENT
Start: 2021-06-04 | End: 2021-06-21 | Stop reason: ALTCHOICE

## 2021-06-04 NOTE — PROGRESS NOTES
[facility] Hospitalist Progress Note     CC: Hospital Follow up    PCP: Katherine Rosas MD       Assessment/Plan:     Principal Problem:    Heart failure, unspecified HF chronicity, unspecified heart failure type [facility])  Active Problems:    Dyspnea    Dyspnea, home hydrochlorothiazide while on lasix     HL  - statin     Hx Kidney stones  - was on hydrochlorothiazide, currently off while on lasix     Morbid obesity  - Body mass index is 41.5 kg/m².   - weight loss endeavors     FN:  - IVF: none  - Diet: cardiac    intact, sensory intact  Psych: Affect- normal  SKIN: warm, dry  EXT: no edema    Data Review:       Labs:     Recent Labs   Lab 05/29/21  2345 05/31/21  0743 06/03/21  0751 06/04/21  0459 06/04/21  1323   RBC 6.38*   < > 7.32* 7.53* 7.36*   HGB 15.9   < >

## 2021-06-04 NOTE — PAYOR COMM NOTE
--------------  CONTINUED STAY REVIEW    Payor: [address] [facility]  Subscriber #:  [id number]  Authorization Number: [id number]    Admit date: 5/30/21  Admit time:  2:03 AM    Admitting Physician: Stephanie Hameed MD  Attending Physician:  Shayne Subramanian Relation Age of Onset   • Diabetes Mother     • Lipids Mother     • Hypertension Mother     • Colon Cancer Other        Family History of premature CAD: n   Social History: Social History    Tobacco Use      Smoking status: Never Smoker      Smokeless toba   CREATSERUM 1.34 06/04/2021     BUN 26 06/04/2021      06/04/2021     K 4.1 06/04/2021     CL 97 06/04/2021     CO2 32.0 06/04/2021      06/04/2021     CA 9.3 06/04/2021            ECHO:  1. Left ventricle:  The cavity size was severely dila and concern for fluid overload, BNP 1200  - TTE 25%  - cardiology consulted, s/p cardiac cath 6/1, normal coronary arteries  - will need LifeVest  - metoprolol and enalapril started  - will consult cardiac rehab  - continue IV lasix       MEME  - never pick weight 246 lb 8 oz (111.8 kg), SpO2 94 %.     Temp:  [97.6 °F (36.4 °C)-98.4 °F (36.9 °C)] 98.4 °F (36.9 °C)  Pulse:  [105-115] 111  Resp:  [16-20] 20  BP: ()/(58-87) 107/79        Intake/Output:     Intake/Output Summary (Last 24 hours) at 6/3/2021 Metoprolol Succinate ER  50 mg Oral Daily Beta Blocker   • furosemide  40 mg Intravenous TID   • Enalapril Maleate  5 mg Oral BID   • aspirin  81 mg Oral Daily   • atorvastatin  40 mg Oral Daily   • Cetirizine HCl  10 mg Oral Daily   • Fluticasone Propiona spironolactone (ALDACTONE) tab 25 mg     Date Action Dose Route User    6/4/2021 0859 Given 25 mg Oral Malinda Zamudio RN      torsemide [facility]) tab 20 mg     Date Action Dose Route User    6/4/2021 0859 Given 20 mg Oral Malinda Zamudio RN

## 2021-06-04 NOTE — PLAN OF CARE
Patient is alert and coherent, with wife at bedside. Denies any pain, on room air without sob. Given IV lasix at hs, to start po lasix in am. For possible discharge today need life vest and cpap at home.      Problem: Patient/Family Goals  Goal: Patient/Fam

## 2021-06-04 NOTE — PLAN OF CARE
Patient alert and oriented x4, saturating well on RA.  RRT called for heart rate in the 180s, pt was in A-fib, see note. Pt later converted back to sinus. No complaints of chest pain or shortness of breath. Started on PO amiodarone.   Plan  to go home wi arrhythmias  - Monitor electrolytes and administer replacement therapy as ordered  Outcome: Progressing     Problem: PAIN - ADULT  Goal: Verbalizes/displays adequate comfort level or patient's stated pain goal  Description: INTERVENTIONS:  - Encourage pt t

## 2021-06-04 NOTE — PROGRESS NOTES
responded to RRT. Pt's wife in the hallway crying on the phone. Pt's wife expressing worry and anxious feelings. Pt's wife expressed fear about pt's condition.   provided calming presence, encouraging pt's wife to breathe slowly through her

## 2021-06-04 NOTE — SIGNIFICANT EVENT
RRT note      Subjective  RRT called for elevated HR. Patient laying flat in bed, had some chest tightness, HR 180s on monitor.        BP 99/76   Pulse (!) 138   Temp 98.2 °F (36.8 °C) (Oral)   Resp 20   Ht 5' 7\" (1.702 m)   Wt 245 lb 1.6 oz (111.2 kg)   S

## 2021-06-04 NOTE — PROGRESS NOTES
ASSESSMENT/PLAN:     39year old man with:     1. Dyspnea secondary to CHF  2. Acute systolic dilated CHF with pulm htn (moderate)  3. Nonischemic cardiomyopathy, EF 20-25%  4. Obesity  5. MEME - never started CPAP  6. HTN, controlled  7.  HL, on statin  9 current outpatient medications on file. Allergies:  No Known Allergies    ROS:     Cardiovascular and respiratory review of systems are negative, except as described above. The patient denies any fevers or chills.   No new neurologic complaints are n assessment. Wall motion was normal; there were no regional wall      motion abnormalities. Doppler parameters are consistent with      abnormal left ventricular relaxation (grade 1 diastolic      dysfunction). 2. Aortic valve: Trivial regurgitation.    3.

## 2021-06-04 NOTE — PROGRESS NOTES
RRT    *See RRT Documentation Record*    Reason the RRT was called: Heart rate above 130  Assessment of patient leading up to RRT: Alert, calm, sitting in bedside chair eating lunch, pt states feeling \"knot\" in chest.  Interventions/Testing: EKG, metopro

## 2021-06-04 NOTE — PROGRESS NOTES
RRT called because of atrial fibrillation with rapid ventricular rates and relative hypotension. IV amiodarone started with conversion to sinus rhythm. Patient is presently asymptomatic and in sinus rhythm. Vital signs are stable.     PLAN:    IV amiodar

## 2021-06-05 PROCEDURE — 82728 ASSAY OF FERRITIN: CPT | Performed by: HOSPITALIST

## 2021-06-05 PROCEDURE — 80048 BASIC METABOLIC PNL TOTAL CA: CPT | Performed by: HOSPITALIST

## 2021-06-05 PROCEDURE — 85027 COMPLETE CBC AUTOMATED: CPT | Performed by: HOSPITALIST

## 2021-06-05 PROCEDURE — 81001 URINALYSIS AUTO W/SCOPE: CPT | Performed by: HOSPITALIST

## 2021-06-05 PROCEDURE — 87040 BLOOD CULTURE FOR BACTERIA: CPT | Performed by: HOSPITALIST

## 2021-06-05 RX ORDER — POTASSIUM CHLORIDE 1.5 G/1.77G
40 POWDER, FOR SOLUTION ORAL ONCE
Status: COMPLETED | OUTPATIENT
Start: 2021-06-05 | End: 2021-06-05

## 2021-06-05 RX ORDER — POTASSIUM CHLORIDE 20 MEQ/1
40 TABLET, EXTENDED RELEASE ORAL ONCE
Status: DISCONTINUED | OUTPATIENT
Start: 2021-06-05 | End: 2021-06-05

## 2021-06-05 RX ORDER — PANTOPRAZOLE SODIUM 40 MG/1
40 TABLET, DELAYED RELEASE ORAL
Status: DISCONTINUED | OUTPATIENT
Start: 2021-06-05 | End: 2021-06-07

## 2021-06-05 NOTE — PLAN OF CARE
Pt is A&Ox4. On RA. Stopped Amiodarone drip at 8pm per md orders. Oral Amiodarone started. Oral Eliquis started. Normal sinus rythmn throughout shift. No calls from tele. Self ambulating and care. Call light within reach. Plan is IV lasix TID.  Lifevest onc INTERVENTIONS:  - Encourage pt to monitor pain and request assistance  - Assess pain using appropriate pain scale  - Administer analgesics based on type and severity of pain and evaluate response  - Implement non-pharmacological measures as appropriate and

## 2021-06-05 NOTE — PROGRESS NOTES
[facility] HOSP - [facility]    Cardiology Progress Note    Vikas Centeno Patient Status:  Inpatient    1975 MRN [id number]   Location [facility] 3W/SW Attending Roque Beltran MD   Hosp Day # 6 PCP Macarena Patel MD       Impression/Plan:  4 kg)      Tele: NSR    Physical Exam:    General: Alert and oriented x 3. No apparent distress. No respiratory or constitutional distress. HEENT: Normocephalic, anicteric sclera, neck supple, no thyromegaly or adenopathy.   Neck: No JVD, carotids 2+, no bru [facility]) tab 20 mg, 20 mg, Oral, Daily  apixaban (ELIQUIS) tab 5 mg, 5 mg, Oral, BID  amiodarone HCl (PACERONE) tab 400 mg, 400 mg, Oral, TID  spironolactone (ALDACTONE) tab 25 mg, 25 mg, Oral, Daily  melatonin cap/tab 5 mg, 5 mg, Oral, Nightly PRN  Metop

## 2021-06-05 NOTE — PROGRESS NOTES
[facility] Hospitalist Progress Note     CC: Hospital Follow up    PCP: Taylor Fried MD       Assessment/Plan:     Principal Problem:    Heart failure, unspecified HF chronicity, unspecified heart failure type [facility])  Active Problems:    Dyspnea    Dyspnea, obstructive stone on R upper pole  - change PPI to BID, has hs of gastritis      Polycythemia  - Hg 17.4 on admit, up to 18.5 today  - no hx smoking/COPD  - hematology consulted, possible due to MEME    Tachycardia  - HR 100s-130s  - sinus on EKG  - TSH WNL kg)  06/01/21 0634 : 254 lb 6.4 oz (115.4 kg)  05/31/21 0455 : 256 lb 1.6 oz (116.2 kg)  05/30/21 0200 : 265 lb (120.2 kg)  05/29/21 2329 : 259 lb (117.5 kg)  05/27/21 2249 : 259 lb (117.5 kg)  10/22/20 1555 : 254 lb (115.2 kg)    Exam  GEN: male in NAD  H Pantoprazole Sodium  40 mg Oral QAM AC       melatonin, acetaminophen, ondansetron HCl, Prochlorperazine Edisylate, Albuterol Sulfate HFA, Alum & Mag Hydroxide-Simeth

## 2021-06-06 PROCEDURE — 84145 PROCALCITONIN (PCT): CPT | Performed by: HOSPITALIST

## 2021-06-06 PROCEDURE — 80048 BASIC METABOLIC PNL TOTAL CA: CPT | Performed by: HOSPITALIST

## 2021-06-06 PROCEDURE — 85025 COMPLETE CBC W/AUTO DIFF WBC: CPT | Performed by: HOSPITALIST

## 2021-06-06 PROCEDURE — 83735 ASSAY OF MAGNESIUM: CPT | Performed by: HOSPITALIST

## 2021-06-06 RX ORDER — POTASSIUM CHLORIDE 1.5 G/1.77G
40 POWDER, FOR SOLUTION ORAL ONCE
Status: COMPLETED | OUTPATIENT
Start: 2021-06-06 | End: 2021-06-06

## 2021-06-06 RX ORDER — POTASSIUM CHLORIDE 20 MEQ/1
40 TABLET, EXTENDED RELEASE ORAL ONCE
Status: DISCONTINUED | OUTPATIENT
Start: 2021-06-06 | End: 2021-06-06

## 2021-06-06 NOTE — PROGRESS NOTES
[facility]    Cardiology Progress Note    Maksim Cantu Patient Status:  Inpatient    1975 MRN [id number]   Location [facility] 3W/SW Attending Tanner Posada MD   Hosp Day # 7 PCP Dionisio Alvares MD       Impression/Plan:  4 kg)  05/31/21 0455 : 256 lb 1.6 oz (116.2 kg)  05/30/21 0200 : 265 lb (120.2 kg)  05/29/21 2329 : 259 lb (117.5 kg)  05/27/21 2249 : 259 lb (117.5 kg)  10/22/20 1555 : 254 lb (115.2 kg)      Tele: SR, ST    Physical Exam:    General: Alert and oriented x 3 TROP <0.045       Allergies:   No Known Allergies    Medications:  Pantoprazole Sodium (PROTONIX) EC tab 40 mg, 40 mg, Oral, BID AC  torsemide (DEMADEX) tab 20 mg, 20 mg, Oral, Daily  apixaban (ELIQUIS) tab 5 mg, 5 mg, Oral, BID  amiodarone HCl (PACERONE

## 2021-06-06 NOTE — PLAN OF CARE
Patient alert and oriented, on room air, reporting no pain today. Up ad lashonda. Patient taking po diuretics, WBC elevated--myeloproliferative labs being checked. Blood culture negative. Life vest pending insurance, otherwise plan to go home with his wife.    P Verbalizes/displays adequate comfort level or patient's stated pain goal  Description: INTERVENTIONS:  - Encourage pt to monitor pain and request assistance  - Assess pain using appropriate pain scale  - Administer analgesics based on type and severity of

## 2021-06-06 NOTE — PLAN OF CARE
Pt is A&Ox4. Per Pt wife will call insurance to get life vest.  Monitoring increased WBC by UA and blood cultures. Monitoring BP to stablize and HGB. Plan is also to have sleep study after discharge. Call light within reach.    Problem: Patient/Family Goals pain using appropriate pain scale  - Administer analgesics based on type and severity of pain and evaluate response  - Implement non-pharmacological measures as appropriate and evaluate response  - Consider cultural and social influences on pain and pain m

## 2021-06-06 NOTE — PROGRESS NOTES
Received call from Dr. Zuri Segura regarding persistent leukocytosis and concern for underlying myeloproliferative disorder.  Derrick Finn saw patient and polycythemia thought to be secondary to likely underlying untreated sleep apnea; however, given persistent l

## 2021-06-06 NOTE — PROGRESS NOTES
[facility] Hospitalist Progress Note     CC: Hospital Follow up    PCP: Bettina Dubose MD       Assessment/Plan:     Principal Problem:    Heart failure, unspecified HF chronicity, unspecified heart failure type [facility])  Active Problems:    Dyspnea    Dyspnea, polycythemia and persistent leucocytosis concern for poss myeloproliferative DO, Labs sent per ONC including Jak2  - onc rec asa in addition to eliquis for now    MEME  - never picked up CPAP   - seen by pulm, plan for expedited PSG as outpatient      Abdom data filed at 6/6/2021 0710  Gross per 24 hour   Intake 240 ml   Output 1350 ml   Net -1110 ml       Last 3 Weights  06/06/21 0500 : 247 lb 9.6 oz (112.3 kg)  06/05/21 0629 : 245 lb 14.4 oz (111.5 kg)  06/04/21 0500 : 245 lb 1.6 oz (111.2 kg)  06/03/21 042 Maleate  5 mg Oral BID   • aspirin  81 mg Oral Daily   • atorvastatin  40 mg Oral Daily   • Cetirizine HCl  10 mg Oral Daily   • Fluticasone Propionate  2 spray Nasal Daily       melatonin, acetaminophen, ondansetron HCl, Prochlorperazine Edisylate, Alb[place]

## 2021-06-07 VITALS
TEMPERATURE: 99 F | BODY MASS INDEX: 38.96 KG/M2 | DIASTOLIC BLOOD PRESSURE: 60 MMHG | OXYGEN SATURATION: 96 % | WEIGHT: 248.19 LBS | RESPIRATION RATE: 18 BRPM | HEART RATE: 96 BPM | SYSTOLIC BLOOD PRESSURE: 96 MMHG | HEIGHT: 67 IN

## 2021-06-07 PROCEDURE — 85025 COMPLETE CBC W/AUTO DIFF WBC: CPT | Performed by: HOSPITALIST

## 2021-06-07 PROCEDURE — 80076 HEPATIC FUNCTION PANEL: CPT | Performed by: HOSPITALIST

## 2021-06-07 PROCEDURE — 80048 BASIC METABOLIC PNL TOTAL CA: CPT | Performed by: HOSPITALIST

## 2021-06-07 PROCEDURE — 83735 ASSAY OF MAGNESIUM: CPT | Performed by: HOSPITALIST

## 2021-06-07 PROCEDURE — 81279 JAK2 GENE TRGT SEQUENCE ALYS: CPT | Performed by: HOSPITALIST

## 2021-06-07 PROCEDURE — 81270 JAK2 GENE: CPT | Performed by: HOSPITALIST

## 2021-06-07 RX ORDER — ASPIRIN 81 MG/1
81 TABLET, CHEWABLE ORAL DAILY
Qty: 30 TABLET | Refills: 0 | Status: SHIPPED | OUTPATIENT
Start: 2021-06-08

## 2021-06-07 RX ORDER — AMIODARONE HYDROCHLORIDE 400 MG/1
400 TABLET ORAL DAILY
Qty: 30 TABLET | Refills: 0 | Status: SHIPPED | OUTPATIENT
Start: 2021-06-07 | End: 2021-07-02

## 2021-06-07 NOTE — PLAN OF CARE
Pt alert/anxiuos. BP running low in the 90's. C/o of a cough. Pt states feeling some dizziness after taking po amio. Call light within reach.    Problem: Patient Centered Care  Goal: Patient preferences are identified and integrated in the patient's plan of cardiac monitoring, monitor vital signs, obtain 12 lead EKG if indicated  - Evaluate effectiveness of antiarrhythmic and heart rate control medications as ordered  - Initiate emergency measures for life threatening arrhythmias  - Monitor electrolytes and a

## 2021-06-07 NOTE — PROGRESS NOTES
[facility] HOSP - [facility]    Cardiology Progress Note    Inna Renee Patient Status:  Inpatient    1975 MRN [id number]   Location [facility] 3W/SW Attending Margret Gifford MD   Hosp Day # 8 PCP Taylor Fried MD       Impression/Plan:  4 kg)  10/22/20 1555 : 254 lb (115.2 kg)      Tele: SR, ST    Physical Exam:    General: Alert and oriented x 3. No apparent distress. No respiratory or constitutional distress.   HEENT: Normocephalic, anicteric sclera, neck supple, no thyromegaly or adenopat (VASOTEC) tab 5 mg, 5 mg, Oral, BID  aspirin chewable tab 81 mg, 81 mg, Oral, Daily  acetaminophen (TYLENOL) tab 650 mg, 650 mg, Oral, Q6H PRN  ondansetron HCl (ZOFRAN) injection 4 mg, 4 mg, Intravenous, Q6H PRN  Prochlorperazine Edisylate (COMPAZINE) inje

## 2021-06-07 NOTE — DISCHARGE SUMMARY
General Medicine Discharge Summary     Patient ID:  Elvira Almanzar  [address]  11/6/1975    Admit date: 5/29/2021    Discharge date and time: 6/7/2021    Attending Physician: Lauren Navarrete MD     Consults: IP CONSULT TO HOSPITALIST  IP CONSULT TO CARDIOLOGY to have shortness of breath with exertion shortness of breath with laying flat and came back to the ER last night. CT chest was negative for PE, but was concerning for fluid overload.  Patient also complaining of L sided abdominal pain, +nausea and vomiting [address] for outpatient fu with ONC in 1-2 weeks       Polycythemia  - Hg 17.4 on admit, up to 18.5  - no hx smoking/COPD  - was receiving testosterone therapy as outpatient   - hematology consulted  - possible MEME untreated?  Testosterone therapy?  - f DEMADEX  Take 1 tablet (20 mg total) by mouth daily. CONTINUE taking these medications    Albuterol Sulfate  (90 Base) MCG/ACT Aers  Inhale 2 puffs into the lungs every 4 (four) hours as needed for Wheezing.      atorvastatin 40 MG Tabs  [facility]  [address] [facility] [address] [facility]             [place]. Schedule an appointment as soon as possible for a visit in 5 days.     Specialty: Nurse Practitioner  Why: NEED FOLLOW UP with CARDIOLOGY repeat labs  Co

## 2021-06-07 NOTE — CARDIAC REHAB
Met with patient and wife for cardiac rehab follow-up. Questions answered. Wife concerned about financial issues and anxiety within the family d/t husbands dx. No need for further intervention at this time.   Will continue to monitor patient needs regar

## 2021-06-07 NOTE — PAYOR COMM NOTE
--------------  CONTINUED STAY REVIEW    Payor: [facility]  Subscriber #:  [id number]  Authorization Number: [id number]    Admit date: 5/30/21  Admit time:  2:03 AM    Admitting Physician: Ramses Hernandez MD  Attending Physician:  Nevaeh Rapp MD Angelica Spencer            Plan: 6/5  Impression/Plan:  39year old male presenting with:     1. Acute systolic heart failure/NICM (EF 20-25%, moderate MR)    3. MEME - never started CPAP  4. HTN, controlled  5. HL, on statin  6.  PAF, new onset     - Co mild-moderate MR)    3. MEME - never started CPAP  4. HTN, controlled  5. HL, on statin  6. PAF, new onset  7.  Persistent leukocytosis; afebrile     - Cont asa, statin, bb, ace-I, torsemide; spironolactone held due to symptomatic hypotension  - Cont po amio

## 2021-06-07 NOTE — PLAN OF CARE
No new complaints. Denies pain. Morning vitals stable. Expect discharge home today pending lifevest planning.      Problem: Patient Centered Care  Goal: Patient preferences are identified and integrated in the patient's plan of care  Description: Tanvi Ely vital signs, obtain 12 lead EKG if indicated  - Evaluate effectiveness of antiarrhythmic and heart rate control medications as ordered  - Initiate emergency measures for life threatening arrhythmias  - Monitor electrolytes and administer replacement therap

## 2021-06-07 NOTE — CM/SW NOTE
CM/SW consulted to assist with [facility].   This CM was notified, by Erasmo Montilla for Scout Reich [phone] patient insurance has denied Phlil Ora for patient. Patient will need Phill Ora for discharge.    A request was made for Supervisor annalise

## 2021-06-08 ENCOUNTER — PATIENT OUTREACH (OUTPATIENT)
Dept: CASE MANAGEMENT | Age: 46
End: 2021-06-08

## 2021-06-08 NOTE — PLAN OF CARE
Discharged home with wife in stable condition. Discharge instructions, medications, side effects, and teaching reviewed with wife at patient request. Questions answered. LifeVest fitted and in place. IV and tele removed.  Printed prescriptions sent home wit vasoactive medications to optimize hemodynamic stability  - Monitor arterial and/or venous puncture sites for bleeding and/or hematoma  - Assess quality of pulses, skin color and temperature  - Assess for signs of decreased coronary artery perfusion - ex.

## 2021-06-08 NOTE — PAYOR COMM NOTE
--------------  DISCHARGE REVIEW    Payor: [address] [employer]  Subscriber #:  [id number]  Authorization Number: [id number]    Admit date: 5/30/21  Admit time:   2:03 AM  Discharge Date: 6/7/2021  7:46 PM     Admitting Physician: MD Singh Babcock experience any uncontrolled bleeding or have a fall in which you hit your head please seek medical assistance.     Exam  Gen: No acute distress  Pulm: Lungs clear, normal respiratory effort  CV: Heart with regular rate and rhythm  Abd: Abdomen soft,   Ext: coronary arteries  - metoprolol and enalapril started--> poss entresto as outpatient   - torsemide 20mg  - unable to tolerate spironolactone consider as outpatient  - outpatient work up to continue, cardiac MRI?   Fu with cardiology in 5-7 days    New onset PACERONE  Take 1 tablet (400 mg total) by mouth daily. apixaban 5 MG Tabs  Commonly known as: ELIQUIS  Take 1 tablet (5 mg total) by mouth 2 (two) times daily. aspirin 81 MG Chew  Chew 1 tablet (81 mg total) by mouth daily.   Start taking on: June 8 DISEASES  Why: pulm office will call you to set up sleep study when insurance approves; can call office if needed  Contact information:  [address] [place] [address] [address]             Jessica Cabral MD On 6/9/2021.     Spec above.       Total Time Coordinating Care: Greater than 30 minutes    Patient had opportunity to ask questions and state understand and agree with therapeutic plan as outlined    Thank Alex Delgadillo M.D.  [facility] Hospitalist  Pager       Electronically sign

## 2021-06-08 NOTE — PROGRESS NOTES
1st attempt [facility] Cardio apt request    [address]  [address]  [address] [facility] [id number] [phone]  Apt made for Tues.  June 15th @12:40pm

## 2021-06-09 PROBLEM — R06.00 DYSPNEA: Status: RESOLVED | Noted: 2021-05-30 | Resolved: 2021-06-09

## 2021-06-09 PROBLEM — R06.00 DYSPNEA, UNSPECIFIED TYPE: Status: RESOLVED | Noted: 2021-05-30 | Resolved: 2021-06-09

## 2021-06-09 PROBLEM — I50.21 ACUTE SYSTOLIC CHF (CONGESTIVE HEART FAILURE) (HCC): Status: ACTIVE | Noted: 2021-06-09

## 2021-06-09 PROBLEM — I48.91 AF (ATRIAL FIBRILLATION) (HCC): Status: ACTIVE | Noted: 2021-06-09

## 2021-06-14 ENCOUNTER — ORDER TRANSCRIPTION (OUTPATIENT)
Dept: CARDIAC REHAB | Facility: HOSPITAL | Age: 46
End: 2021-06-14

## 2021-06-14 DIAGNOSIS — I50.20 SYSTOLIC CONGESTIVE HEART FAILURE (HCC): Primary | ICD-10-CM

## 2021-06-15 PROBLEM — G47.33 OBSTRUCTIVE SLEEP APNEA SYNDROME: Status: ACTIVE | Noted: 2018-02-19

## 2021-06-21 ENCOUNTER — OFFICE VISIT (OUTPATIENT)
Dept: CARDIOLOGY CLINIC | Facility: HOSPITAL | Age: 46
End: 2021-06-21
Attending: NURSE PRACTITIONER
Payer: COMMERCIAL

## 2021-06-21 VITALS
HEART RATE: 82 BPM | BODY MASS INDEX: 38 KG/M2 | SYSTOLIC BLOOD PRESSURE: 107 MMHG | WEIGHT: 242 LBS | DIASTOLIC BLOOD PRESSURE: 70 MMHG | OXYGEN SATURATION: 99 %

## 2021-06-21 DIAGNOSIS — I50.9 HEART FAILURE, UNSPECIFIED HF CHRONICITY, UNSPECIFIED HEART FAILURE TYPE (HCC): Primary | ICD-10-CM

## 2021-06-21 DIAGNOSIS — I48.0 PAROXYSMAL ATRIAL FIBRILLATION (HCC): ICD-10-CM

## 2021-06-21 DIAGNOSIS — G47.33 OBSTRUCTIVE SLEEP APNEA SYNDROME: ICD-10-CM

## 2021-06-21 PROCEDURE — 99203 OFFICE O/P NEW LOW 30 MIN: CPT | Performed by: NURSE PRACTITIONER

## 2021-06-21 PROCEDURE — 99204 OFFICE O/P NEW MOD 45 MIN: CPT | Performed by: NURSE PRACTITIONER

## 2021-06-21 RX ORDER — SACUBITRIL AND VALSARTAN 24; 26 MG/1; MG/1
1 TABLET, FILM COATED ORAL 2 TIMES DAILY
Qty: 60 TABLET | Refills: 0 | Status: SHIPPED | OUTPATIENT
Start: 2021-06-21 | End: 2021-07-19

## 2021-06-21 NOTE — PATIENT INSTRUCTIONS
Stop Enalapril    Start Entresto 24/26mg twice a day - start taking on Wednesday    Repeat blood work on 7/1/21 before seeing Dr. Haley Carbone    Return to [address] [place] [address] on 7/26/21    Follow up with Dr. Haley Carbone on 7/2/21    Follow up with Dr. Lenka Saucedo on 7

## 2021-06-21 NOTE — PROGRESS NOTES
[address] Patient Status:  No patient class for patient encounter    1975 MRN [id number]   Location MD Dr. [address] Dr. 06/14/2021 09:59 AM    CREATSERUM 1.09 06/14/2021 09:59 AM    BUN 16.0 06/14/2021 09:59 AM     (L) 06/14/2021 09:59 AM    K 3.82 06/14/2021 09:59 AM    CL 94 (L) 06/14/2021 09:59 AM    CO2 29.1 (H) 06/14/2021 09:59 AM     (H) 06/14/2021 09:59 hours, purpose of clinic visits, sodium restricted diet, low sodium foods, fluid restrictions, daily weights, medication regimen s/s heart failure exacerbation and when to call APN/clinic. Patient and family receptive.     Assessment:  HFrEF  -EF 20-25%, no lightheadedness, heart racing, palpitations, chest pain, shortness of breath, coughing, swelling, weight gain or worsening symptoms. 32–64 ounces of fluid daily    Less than 2000 mg salt/sodium daily.  Common high sodium foods include frozen dinners, so

## 2021-06-23 ENCOUNTER — CARDPULM VISIT (OUTPATIENT)
Dept: CARDIAC REHAB | Facility: HOSPITAL | Age: 46
End: 2021-06-23
Attending: INTERNAL MEDICINE
Payer: COMMERCIAL

## 2021-06-23 DIAGNOSIS — I50.20 SYSTOLIC CONGESTIVE HEART FAILURE (HCC): ICD-10-CM

## 2021-06-30 ENCOUNTER — CARDPULM VISIT (OUTPATIENT)
Dept: CARDIAC REHAB | Facility: HOSPITAL | Age: 46
End: 2021-06-30
Attending: INTERNAL MEDICINE
Payer: COMMERCIAL

## 2021-06-30 PROCEDURE — 93798 PHYS/QHP OP CAR RHAB W/ECG: CPT

## 2021-07-02 ENCOUNTER — CARDPULM VISIT (OUTPATIENT)
Dept: CARDIAC REHAB | Facility: HOSPITAL | Age: 46
End: 2021-07-02
Attending: INTERNAL MEDICINE
Payer: COMMERCIAL

## 2021-07-02 PROCEDURE — 93798 PHYS/QHP OP CAR RHAB W/ECG: CPT

## 2021-07-06 ENCOUNTER — TELEPHONE (OUTPATIENT)
Dept: CARDIOLOGY CLINIC | Facility: HOSPITAL | Age: 46
End: 2021-07-06

## 2021-07-06 NOTE — TELEPHONE ENCOUNTER
Called patient regarding lab results. Spoke to patient's wife. She reports patient is feeling well after starting entresto. Patient has follow up with Gabriel Oliver on 7/19 and with Dr. Jeanette Chapman on 8/2. Instructed wife to call with any questions or concerns.

## 2021-07-07 ENCOUNTER — CARDPULM VISIT (OUTPATIENT)
Dept: CARDIAC REHAB | Facility: HOSPITAL | Age: 46
End: 2021-07-07
Attending: INTERNAL MEDICINE
Payer: COMMERCIAL

## 2021-07-07 PROCEDURE — 93798 PHYS/QHP OP CAR RHAB W/ECG: CPT

## 2021-07-09 ENCOUNTER — CARDPULM VISIT (OUTPATIENT)
Dept: CARDIAC REHAB | Facility: HOSPITAL | Age: 46
End: 2021-07-09
Attending: INTERNAL MEDICINE
Payer: COMMERCIAL

## 2021-07-09 PROCEDURE — 93798 PHYS/QHP OP CAR RHAB W/ECG: CPT

## 2021-07-12 ENCOUNTER — CARDPULM VISIT (OUTPATIENT)
Dept: CARDIAC REHAB | Facility: HOSPITAL | Age: 46
End: 2021-07-12
Attending: INTERNAL MEDICINE
Payer: COMMERCIAL

## 2021-07-12 PROCEDURE — 93798 PHYS/QHP OP CAR RHAB W/ECG: CPT

## 2021-07-14 ENCOUNTER — CARDPULM VISIT (OUTPATIENT)
Dept: CARDIAC REHAB | Facility: HOSPITAL | Age: 46
End: 2021-07-14
Attending: INTERNAL MEDICINE
Payer: COMMERCIAL

## 2021-07-14 PROCEDURE — 93798 PHYS/QHP OP CAR RHAB W/ECG: CPT

## 2021-07-16 ENCOUNTER — CARDPULM VISIT (OUTPATIENT)
Dept: CARDIAC REHAB | Facility: HOSPITAL | Age: 46
End: 2021-07-16
Attending: INTERNAL MEDICINE
Payer: COMMERCIAL

## 2021-07-16 PROCEDURE — 93798 PHYS/QHP OP CAR RHAB W/ECG: CPT

## 2021-07-21 ENCOUNTER — CARDPULM VISIT (OUTPATIENT)
Dept: CARDIAC REHAB | Facility: HOSPITAL | Age: 46
End: 2021-07-21
Attending: INTERNAL MEDICINE
Payer: COMMERCIAL

## 2021-07-21 PROCEDURE — 93798 PHYS/QHP OP CAR RHAB W/ECG: CPT

## 2021-07-23 ENCOUNTER — CARDPULM VISIT (OUTPATIENT)
Dept: CARDIAC REHAB | Facility: HOSPITAL | Age: 46
End: 2021-07-23
Attending: INTERNAL MEDICINE
Payer: COMMERCIAL

## 2021-07-23 PROCEDURE — 93798 PHYS/QHP OP CAR RHAB W/ECG: CPT

## 2021-07-26 ENCOUNTER — CARDPULM VISIT (OUTPATIENT)
Dept: CARDIAC REHAB | Facility: HOSPITAL | Age: 46
End: 2021-07-26
Attending: INTERNAL MEDICINE
Payer: COMMERCIAL

## 2021-07-26 PROCEDURE — 93798 PHYS/QHP OP CAR RHAB W/ECG: CPT

## 2021-07-28 ENCOUNTER — CARDPULM VISIT (OUTPATIENT)
Dept: CARDIAC REHAB | Facility: HOSPITAL | Age: 46
End: 2021-07-28
Attending: INTERNAL MEDICINE
Payer: COMMERCIAL

## 2021-07-28 PROCEDURE — 93798 PHYS/QHP OP CAR RHAB W/ECG: CPT

## 2021-07-30 ENCOUNTER — CARDPULM VISIT (OUTPATIENT)
Dept: CARDIAC REHAB | Facility: HOSPITAL | Age: 46
End: 2021-07-30
Attending: INTERNAL MEDICINE
Payer: COMMERCIAL

## 2021-07-30 PROCEDURE — 93798 PHYS/QHP OP CAR RHAB W/ECG: CPT

## 2021-08-02 ENCOUNTER — CARDPULM VISIT (OUTPATIENT)
Dept: CARDIAC REHAB | Facility: HOSPITAL | Age: 46
End: 2021-08-02
Attending: INTERNAL MEDICINE
Payer: COMMERCIAL

## 2021-08-02 PROCEDURE — 93798 PHYS/QHP OP CAR RHAB W/ECG: CPT

## 2021-08-04 ENCOUNTER — CARDPULM VISIT (OUTPATIENT)
Dept: CARDIAC REHAB | Facility: HOSPITAL | Age: 46
End: 2021-08-04
Attending: INTERNAL MEDICINE
Payer: COMMERCIAL

## 2021-08-04 PROCEDURE — 93798 PHYS/QHP OP CAR RHAB W/ECG: CPT

## 2021-08-06 ENCOUNTER — CARDPULM VISIT (OUTPATIENT)
Dept: CARDIAC REHAB | Facility: HOSPITAL | Age: 46
End: 2021-08-06
Attending: INTERNAL MEDICINE
Payer: COMMERCIAL

## 2021-08-06 PROCEDURE — 93798 PHYS/QHP OP CAR RHAB W/ECG: CPT

## 2021-08-09 ENCOUNTER — CARDPULM VISIT (OUTPATIENT)
Dept: CARDIAC REHAB | Facility: HOSPITAL | Age: 46
End: 2021-08-09
Attending: INTERNAL MEDICINE
Payer: COMMERCIAL

## 2021-08-09 PROCEDURE — 93798 PHYS/QHP OP CAR RHAB W/ECG: CPT

## 2021-08-11 ENCOUNTER — CARDPULM VISIT (OUTPATIENT)
Dept: CARDIAC REHAB | Facility: HOSPITAL | Age: 46
End: 2021-08-11
Attending: INTERNAL MEDICINE
Payer: COMMERCIAL

## 2021-08-11 PROCEDURE — 93798 PHYS/QHP OP CAR RHAB W/ECG: CPT

## 2021-08-13 ENCOUNTER — CARDPULM VISIT (OUTPATIENT)
Dept: CARDIAC REHAB | Facility: HOSPITAL | Age: 46
End: 2021-08-13
Attending: INTERNAL MEDICINE
Payer: COMMERCIAL

## 2021-08-13 PROCEDURE — 93798 PHYS/QHP OP CAR RHAB W/ECG: CPT

## 2021-08-16 ENCOUNTER — CARDPULM VISIT (OUTPATIENT)
Dept: CARDIAC REHAB | Facility: HOSPITAL | Age: 46
End: 2021-08-16
Attending: INTERNAL MEDICINE
Payer: COMMERCIAL

## 2021-08-16 PROCEDURE — 93798 PHYS/QHP OP CAR RHAB W/ECG: CPT

## 2021-08-18 ENCOUNTER — CARDPULM VISIT (OUTPATIENT)
Dept: CARDIAC REHAB | Facility: HOSPITAL | Age: 46
End: 2021-08-18
Attending: INTERNAL MEDICINE
Payer: COMMERCIAL

## 2021-08-18 PROCEDURE — 93798 PHYS/QHP OP CAR RHAB W/ECG: CPT

## 2021-08-20 ENCOUNTER — CARDPULM VISIT (OUTPATIENT)
Dept: CARDIAC REHAB | Facility: HOSPITAL | Age: 46
End: 2021-08-20
Attending: INTERNAL MEDICINE
Payer: COMMERCIAL

## 2021-08-20 PROCEDURE — 93798 PHYS/QHP OP CAR RHAB W/ECG: CPT

## 2021-08-23 ENCOUNTER — CARDPULM VISIT (OUTPATIENT)
Dept: CARDIAC REHAB | Facility: HOSPITAL | Age: 46
End: 2021-08-23
Attending: INTERNAL MEDICINE
Payer: COMMERCIAL

## 2021-08-23 PROCEDURE — 93798 PHYS/QHP OP CAR RHAB W/ECG: CPT

## 2021-08-25 ENCOUNTER — CARDPULM VISIT (OUTPATIENT)
Dept: CARDIAC REHAB | Facility: HOSPITAL | Age: 46
End: 2021-08-25
Attending: INTERNAL MEDICINE
Payer: COMMERCIAL

## 2021-08-25 PROCEDURE — 93798 PHYS/QHP OP CAR RHAB W/ECG: CPT

## 2021-08-27 ENCOUNTER — CARDPULM VISIT (OUTPATIENT)
Dept: CARDIAC REHAB | Facility: HOSPITAL | Age: 46
End: 2021-08-27
Attending: INTERNAL MEDICINE
Payer: COMMERCIAL

## 2021-08-27 PROCEDURE — 93798 PHYS/QHP OP CAR RHAB W/ECG: CPT

## 2021-08-30 ENCOUNTER — CARDPULM VISIT (OUTPATIENT)
Dept: CARDIAC REHAB | Facility: HOSPITAL | Age: 46
End: 2021-08-30
Attending: INTERNAL MEDICINE
Payer: COMMERCIAL

## 2021-08-30 PROCEDURE — 93798 PHYS/QHP OP CAR RHAB W/ECG: CPT

## 2021-09-01 ENCOUNTER — CARDPULM VISIT (OUTPATIENT)
Dept: CARDIAC REHAB | Facility: HOSPITAL | Age: 46
End: 2021-09-01
Attending: INTERNAL MEDICINE
Payer: COMMERCIAL

## 2021-09-01 PROCEDURE — 93798 PHYS/QHP OP CAR RHAB W/ECG: CPT

## 2021-09-03 ENCOUNTER — CARDPULM VISIT (OUTPATIENT)
Dept: CARDIAC REHAB | Facility: HOSPITAL | Age: 46
End: 2021-09-03
Attending: INTERNAL MEDICINE
Payer: COMMERCIAL

## 2021-09-03 PROCEDURE — 93798 PHYS/QHP OP CAR RHAB W/ECG: CPT

## 2021-09-08 ENCOUNTER — CARDPULM VISIT (OUTPATIENT)
Dept: CARDIAC REHAB | Facility: HOSPITAL | Age: 46
End: 2021-09-08
Attending: INTERNAL MEDICINE
Payer: COMMERCIAL

## 2021-09-08 PROCEDURE — 93798 PHYS/QHP OP CAR RHAB W/ECG: CPT

## 2021-09-10 ENCOUNTER — CARDPULM VISIT (OUTPATIENT)
Dept: CARDIAC REHAB | Facility: HOSPITAL | Age: 46
End: 2021-09-10
Attending: INTERNAL MEDICINE
Payer: COMMERCIAL

## 2021-09-10 PROCEDURE — 93798 PHYS/QHP OP CAR RHAB W/ECG: CPT

## 2021-09-13 ENCOUNTER — CARDPULM VISIT (OUTPATIENT)
Dept: CARDIAC REHAB | Facility: HOSPITAL | Age: 46
End: 2021-09-13
Attending: INTERNAL MEDICINE
Payer: COMMERCIAL

## 2021-09-13 PROCEDURE — 93798 PHYS/QHP OP CAR RHAB W/ECG: CPT

## 2021-09-15 ENCOUNTER — CARDPULM VISIT (OUTPATIENT)
Dept: CARDIAC REHAB | Facility: HOSPITAL | Age: 46
End: 2021-09-15
Attending: INTERNAL MEDICINE
Payer: COMMERCIAL

## 2021-09-15 PROCEDURE — 93798 PHYS/QHP OP CAR RHAB W/ECG: CPT

## 2021-09-17 ENCOUNTER — CARDPULM VISIT (OUTPATIENT)
Dept: CARDIAC REHAB | Facility: HOSPITAL | Age: 46
End: 2021-09-17
Attending: INTERNAL MEDICINE
Payer: COMMERCIAL

## 2021-09-17 PROCEDURE — 93798 PHYS/QHP OP CAR RHAB W/ECG: CPT

## 2021-09-20 ENCOUNTER — CARDPULM VISIT (OUTPATIENT)
Dept: CARDIAC REHAB | Facility: HOSPITAL | Age: 46
End: 2021-09-20
Attending: INTERNAL MEDICINE
Payer: COMMERCIAL

## 2021-09-20 PROCEDURE — 93798 PHYS/QHP OP CAR RHAB W/ECG: CPT

## 2021-09-22 ENCOUNTER — CARDPULM VISIT (OUTPATIENT)
Dept: CARDIAC REHAB | Facility: HOSPITAL | Age: 46
End: 2021-09-22
Attending: INTERNAL MEDICINE
Payer: COMMERCIAL

## 2021-09-22 PROCEDURE — 93798 PHYS/QHP OP CAR RHAB W/ECG: CPT

## 2021-09-24 ENCOUNTER — CARDPULM VISIT (OUTPATIENT)
Dept: CARDIAC REHAB | Facility: HOSPITAL | Age: 46
End: 2021-09-24
Attending: INTERNAL MEDICINE
Payer: COMMERCIAL

## 2021-09-24 PROCEDURE — 93798 PHYS/QHP OP CAR RHAB W/ECG: CPT

## 2021-09-27 ENCOUNTER — CARDPULM VISIT (OUTPATIENT)
Dept: CARDIAC REHAB | Facility: HOSPITAL | Age: 46
End: 2021-09-27
Attending: INTERNAL MEDICINE
Payer: COMMERCIAL

## 2021-09-27 PROCEDURE — 93798 PHYS/QHP OP CAR RHAB W/ECG: CPT

## 2021-09-29 ENCOUNTER — APPOINTMENT (OUTPATIENT)
Dept: CARDIAC REHAB | Facility: HOSPITAL | Age: 46
End: 2021-09-29
Attending: INTERNAL MEDICINE
Payer: COMMERCIAL

## (undated) NOTE — LETTER
[address], [place]  Authorization for Invasive Procedures  1.  I hereby authorize Dr. Mindy Fajardo , my physician and whomever may be designated as the doctor's assistant, to perform the following operation and/or procedure:  Panchito Maya despite careful testing and screening of blood and blood products, I may still be subject to ill effects as a result of recieving a blood transfusion an/or blood producst. The following are some, but not all, of the potential risks that can occur: fever an acknowledge that my physician has explained sedation/analgesia administration to me including the risks and benefits. I consent to the administration of sedation/analgesia as may be necessary or desirable in the judgment of my physician.      Signature of ELIZABETH

## (undated) NOTE — ED AVS SNAPSHOT
Merdis Leventhal   MRN: [id number]    Department:  [facility] Emergency Department   Date of Visit:  6/2/2018           Disclosure     Insurance plans vary and the physician(s) referred by the ER may not be covered by your plan.  Please contact yo CARE PHYSICIAN AT ONCE OR RETURN IMMEDIATELY TO THE EMERGENCY DEPARTMENT. If you have been prescribed any medication(s), please fill your prescription right away and begin taking the medication(s) as directed.   If you believe that any of the medications